# Patient Record
Sex: MALE | Race: WHITE | Employment: OTHER | URBAN - METROPOLITAN AREA
[De-identification: names, ages, dates, MRNs, and addresses within clinical notes are randomized per-mention and may not be internally consistent; named-entity substitution may affect disease eponyms.]

---

## 2021-04-22 ENCOUNTER — OFFICE VISIT (OUTPATIENT)
Dept: PULMONOLOGY | Facility: CLINIC | Age: 76
End: 2021-04-22
Attending: INTERNAL MEDICINE
Payer: COMMERCIAL

## 2021-04-22 VITALS
BODY MASS INDEX: 23.84 KG/M2 | WEIGHT: 176 LBS | RESPIRATION RATE: 17 BRPM | HEIGHT: 72 IN | HEART RATE: 85 BPM | SYSTOLIC BLOOD PRESSURE: 136 MMHG | OXYGEN SATURATION: 97 % | DIASTOLIC BLOOD PRESSURE: 71 MMHG

## 2021-04-22 DIAGNOSIS — I73.00 RAYNAUD'S DISEASE WITHOUT GANGRENE: ICD-10-CM

## 2021-04-22 DIAGNOSIS — M34.1 CREST (CALCINOSIS, RAYNAUD'S PHENOMENON, ESOPHAGEAL DYSFUNCTION, SCLERODACTYLY, TELANGIECTASIA) (H): ICD-10-CM

## 2021-04-22 DIAGNOSIS — K21.9 GASTROESOPHAGEAL REFLUX DISEASE WITHOUT ESOPHAGITIS: ICD-10-CM

## 2021-04-22 PROCEDURE — 99205 OFFICE O/P NEW HI 60 MIN: CPT | Performed by: INTERNAL MEDICINE

## 2021-04-22 PROCEDURE — G0463 HOSPITAL OUTPT CLINIC VISIT: HCPCS

## 2021-04-22 PROCEDURE — 99417 PROLNG OP E/M EACH 15 MIN: CPT | Performed by: INTERNAL MEDICINE

## 2021-04-22 RX ORDER — CAPSAICIN 0.025 %
CREAM (GRAM) TOPICAL
COMMUNITY
Start: 2020-10-28

## 2021-04-22 RX ORDER — TAMSULOSIN HYDROCHLORIDE 0.4 MG/1
0.4 CAPSULE ORAL
COMMUNITY
Start: 2019-12-16

## 2021-04-22 RX ORDER — PILOCARPINE HYDROCHLORIDE 5 MG/1
5 TABLET, FILM COATED ORAL
COMMUNITY
Start: 2020-04-20

## 2021-04-22 RX ORDER — OMEPRAZOLE 40 MG/1
40 CAPSULE, DELAYED RELEASE ORAL
COMMUNITY
Start: 2021-04-12

## 2021-04-22 RX ORDER — LORATADINE 10 MG/1
TABLET ORAL
COMMUNITY

## 2021-04-22 RX ORDER — MULTIVITAMIN,THERAPEUTIC
1 TABLET ORAL
COMMUNITY

## 2021-04-22 RX ORDER — FERROUS SULFATE 325(65) MG
325 TABLET ORAL
COMMUNITY
Start: 2020-09-10

## 2021-04-22 RX ORDER — SILDENAFIL 100 MG/1
100 TABLET, FILM COATED ORAL EVERY 24 HOURS
COMMUNITY
Start: 2020-01-08

## 2021-04-22 ASSESSMENT — PAIN SCALES - GENERAL: PAINLEVEL: NO PAIN (0)

## 2021-04-22 ASSESSMENT — MIFFLIN-ST. JEOR: SCORE: 1557.36

## 2021-04-22 NOTE — PROGRESS NOTES
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    He is being very carefully and appropriately monitored, and I have little to add to that.  We did have long discussions today about what is known about the disease process some of the possible benefits of certain elements of the diet such as fresh fruits and vegetables knots and other dietary items that could serve to improve circulation.  We also discussed good dietary sources of iron which he may need to continue to seek out as well as taking supplemental iron tablets together with an exogenous source of acid such as citrus or vinegar's to assist in the absorption.    It sounds to me as though he has esophageal spasm.  We talked about the possible role of all toyed mints s in breaking those spasms.  We also discussed that should these episodes become more severe or more frequent or more prolonged that sometimes diltiazem can help although it is a little bit more likely to cause side effects than the nifedipine so I would not automatically switch him over to it unless things worsen.    We discussed that his exercise tolerance with his existing exercise will be a good barometer of whether we need to be more concerned about the development of pulmonary hypertension which he is of course at risk for.  If he thinks he is having worsening exercise tolerance I would potentially do his cardiopulmonary screening sooner, but I think given his overall long-term stability in general good health, as well as his stable Raynauds, that he can continue with annual monitoring with pulmonary function tests and an echocardiogram.  I think he probably can perform very well and a 6-minute  walk test as well which would be useful to make sure he is not having any oxygen desaturation, although he will need either a forehead or probably an ear probe because of his Raynauds.    In the event that he has 10% or greater drop in his DLCO I would certainly be concerned about pulmonary hypertension and I would want to repeat those PFTs within another 3 to 4 months and if the drop seem to be persistent would consider referring him for a right heart catheterization.  Desaturation on 6-minute walk test would heighten that concern.    He otherwise sounds to be doing very well.  We discussed many aspects of the disease today.  I have attempted to answer all his questions.  I am happy to see him back or speak with him or his physicians by phone at his request with any questions.    This visit was 90  mins in duration, 60 minutes face to face, with an additional 30  mins. In chart review and documentation performed on DOS.     MESERET Sidhu MD, PhD    Rheumatology                History of Present Illness:   Hiram Mendiola is a 76 year old male who presents for a second opinion regarding long-term prognosis and treatment options of his limited cutaneous systemic sclerosis/crest.    Patient is here at the urging of his daughters, 1 of whom is a physician assistant and the other from is a pediatrician at [facility] [employer] [facility] [employer] [place].  He has had longstanding limited cutaneous systemic sclerosis starting almost 24 years ago with the development of Raynaud's phenomenon in his fingers.  Over time he has also developed significant esophageal symptoms and some low-grade keratoconjunctivitis sicca but has otherwise remained quite stable.    He has been getting routine care with appropriate monitoring of his cardiopulmonary situation and GI disease through the [facility] system in [place] and is plugged in with primary care, rheumatology, GI, and pulmonary.    Overall symptomatically his biggest problem most of  the time is GERD.  That is bad enough that he can have difficulty even if he waits 4 hours after eating to lie down and with the head of his bed elevated.  He has been on proton pump inhibitor and is just recently had that increased to 40 mg twice daily omeprazole and is following up with a physician assistant at the local gastroenterologist.  He does think he is may be a little better on this.    He is also had episodes of chest pain that were worked up and found not to represent coronary artery disease.  These sound very like esophageal spasm based on my history with him today.  In general they go away in relatively short order sometimes just in minutes although the more severe ones have lasted up to several hours.    He has been on nifedipine for quite some time together with sildenafil for his Raynaud's phenomenon.  He finds both of those helpful.  He gets a little bit of lower extremity edema but nothing severe.  He is generally able to break Raynaud's attacks within less than 15 minutes without a good deal of difficulty.  He has not had any significant digital ischemia although he does get some cuts occasionally on the ends of his fingers that take a few days to heal but nothing more prolonged or problematic.    From musculoskeletal standpoint he is getting a little bit of progressive DJD in his knees but nothing severe in that regard and he denies inflammatory morning stiffness symptoms.  He has a little bit of swelling in his fingers and associated skin thickening there but has not noticed significant skin thickening elsewhere.    He has had some anemia and has been placed on ferrous sulfate which seems to have corrected that.  He also eats salmon several times a week.  He had an endoscopy on July 1 of 2020 which I do not have the records for.  He does tell me that they told him there might be a little bit of bleeding on that suggesting that he possibly has a small amount of gastric antral venous ectasias.   He has relatively minimal telangiectasias over his face and hands however so I am not even certain of that.    He reports some very occasional muscle weakness in his legs, but nothing progressive over time and nothing that inhibits him from getting in and out of a chair or another low surface or in and out of the bathtub or up and down stairs.  He actually walks quite a lot fairly vigorously.  He used to run, and he thinks that his overall exercise tolerance with the walking is stable.  He did have cardiopulmonary work-up this summer, and lung volumes were very good, DLCO was at 74% of predicted and his echocardiogram showed a borderline PA systolic pressure estimate in the low 30s, but no evidence of right ventricular strain.    PMhx, Fhx, Shx, Allergies reviewed with pt. Unchanged from the appended [facility] cover sheet below.                Review of Systems:   Review Of Systems  Skin: negative  Eyes: negative  Ears/Nose/Throat: negative  Respiratory: No shortness of breath, dyspnea on exertion, cough, or hemoptysis  Cardiovascular: negative  Gastrointestinal: negative  Genitourinary: negative  Musculoskeletal: negative  Neurologic: negative  Psychiatric: negative  Hematologic/Lymphatic/Immunologic: negative  Endocrine: negative          Past Medical History:   History reviewed. No pertinent past medical history.    There are no active problems to display for this patient.  PMhx, Fhx, Shx, Allergies reviewed with pt. Unchanged from the appended [facility] cover sheet below.             Past Surgical History:   History reviewed. No pertinent surgical history.   PMhx, Fhx, Shx, Allergies reviewed with pt. Unchanged from the appended [facility] cover sheet below.          Social History:     Social History     Tobacco Use     Smoking status: Never Smoker     Smokeless tobacco: Never Used   Substance Use Topics     Alcohol use: Not on file             Family History:   History reviewed. No pertinent family  "history.  PMhx, Fhx, Shx, Allergies reviewed with pt. Unchanged from the appended [facility] cover sheet below.          Allergies:     Allergies   Allergen Reactions     No Clinical Screening - See Comments Other (See Comments)     Sneezing and watery eyes             Medications:     Current Outpatient Medications   Medication Sig Dispense Refill     capsaicin (ZOSTRIX) 0.025 % external cream capsaicin 0.025 % topical cream       ferrous sulfate (FEROSUL) 325 (65 Fe) MG tablet 325 mg       loratadine (CLARITIN) 10 MG tablet        Multiple Vitamin (THERA-TABS) TABS Take 1 tablet by mouth       NIFEdipine ER (ADALAT CC) 60 MG 24 hr tablet 60 mg       omeprazole (PRILOSEC) 40 MG DR capsule Take 40 mg by mouth       pilocarpine (SALAGEN) 5 MG tablet Take 5 mg by mouth       sildenafil (VIAGRA) 100 MG tablet Take 100 mg by mouth every 24 hours       tamsulosin (FLOMAX) 0.4 MG capsule 0.4 mg       vitamin C (ASCORBIC ACID) 250 MG TABS tablet Take 250 mg by mouth       vitamin D3 (CHOLECALCIFEROL) 125 MCG (5000 UT) tablet Take 5,000 Units by mouth              Physical Exam:   Blood pressure 136/71, pulse 85, resp. rate 17, height 1.822 m (5' 11.75\"), weight 79.8 kg (176 lb), SpO2 97 %.  Constitutional: WD-WN-WG cooperative  Eyes: nl EOM, PERRLA, vision, conjunctiva, sclera  ENT: nl external ears, nose, hearing, lips, teeth, gums, throat  No mucous membrane lesions, normal saliva pool  Neck: no mass or thyroid enlargement  Resp: lungs clear to auscultation, nl to palpation  CV: RRR, no murmurs, rubs or gallops, no edema  GI: no ABD mass or tenderness, no HSM  : not tested  Lymph: no cervical, supraclavicular, inguinal or epitrochlear nodes  MS: All TMJ, neck, shoulder, elbow, wrist, MCP/PIP/DIP, spine, hip, knee, ankle, and foot MTP/IP joints were examined and  found normal. No active synovitis or deformity. Full ROM.  Normal  strength. No dactylitis,  tenosynovitis, enthespathy   Skin: He has only a few scattered " telangiectasias over the face chest and hands.  No areas of calcinosis that I can feel or see.  He has 3+ skin thickening distal to the PIPs but no significant contractures and no other areas of convincing skin thickening.  No nail pitting, alopecia, rash, nodules or lesions  Neuro: nl cranial nerves, strength, sensation, DTRs.   Psych: nl judgement, orientation, memory, affect.         Data:   No results found for: WBC, HGB, HCT, MCV, PLT  No results found for: BUN, CREATININE  No components found for: SEDRATE  No results found for: TSH  No results found for: AST, ALT, GGT, ALKPHOS  Reviewed Rheumatology lab flowsheet    MD MESERET Nixon MD, PhD    Rheumatology    [facility] cover sheet:      April 22, 2021  Printout Information    Document Contents Document Received Date Document Source Organization   Clinical Summary Apr. 22, 2021April 22, 2021 [facility]     Patient Demographics  - 76 y.o. Male; born [birth year]   Patient Address Communication Language Race / Ethnicity Marital Status   [address] [place] [address] [phone] (Home)  [phone] (Mobile)  [email or web address] English - Written (Preferred) White / Non-      Allergies    Active Allergy Reactions Severity Noted Date Comments   Environmental Substances Other Low 05/16/2018 Sneezing and watery eyes       Medications  Reconcile with Patient's Chart  Medication Sig Dispensed Refills Start Date End Date Status   multivitamin therapeutic oral Tablet   Take 1 tablet by mouth.   0     Active   sildenafiL (REVATIO) 20 mg oral Tablet    Indications: Erectile dysfunction due to diseases classified elsewhere Take 1 tablet (20 mg) by mouth as directed. 60-100mg qday PRN erectile dysfunction 60 tablet   11 05/27/2020   Active   ferrous sulfate 325 mg (65 mg iron) oral Tablet    Indications: Iron deficiency anemia, unspecified iron deficiency anemia type Take 1  tablet (325 mg) by mouth three times daily. 270 tablet   3 09/10/2020   Active   NIFEdipine (ADALAT CC) 60 mg oral Tablet Sustained Release    Indications: Raynaud's disease without gangrene Take 1 tablet (60 mg) by mouth once daily. 90 tablet   3 09/10/2020   Active   tamsulosin (FLOMAX) 0.4 mg oral Capsule    Indications: Benign prostatic hyperplasia, unspecified whether lower urinary tract symptoms present Take 1 capsule (0.4 mg) by mouth daily at bedtime. 90 capsule   3 09/10/2020   Active   capsaicin (ZOSTRIX) 0.025 % topical Cream    Indications: Primary osteoarthritis of both knees Apply to the affected area(s) three times daily. Apply to affected areas. 1 Tube   3 10/28/2020   Active   cholecalciferol (VITAMIN D3) 5,000 unit oral Tablet   Take 5,000 Units by mouth once daily. Patient is not sure on dose   0     Active   ascorbic acid, vitamin C, (VITAMIN C) 250 mg oral Tablet   Take 250 mg by mouth as needed. Patient not sure on dose, crushes pill   0     Active   psyllium husk (METAMUCIL ORAL)   by mouth. Fiber biscuits two every day.   0     Active   omeprazole (PRILOSEC) 40 mg oral Capsule, Delayed Release(E.C.)    Indications: Gastroesophageal reflux disease, unspecified whether esophagitis present Take 1 capsule (40 mg) by mouth twice daily before meals. 180 capsule   3 2021   Active   pilocarpine (SALAGEN) 5 mg oral Tablet   Take 5 mg by mouth three times daily.   0 2021   Active   loratadine (CLARITIN) 10 mg oral Tablet   Take by mouth once daily.   0     Active   omeprazole (PRILOSEC) 40 mg oral Capsule, Delayed Release(E.C.)    Indications: Gastroesophageal reflux disease, unspecified whether esophagitis present Take 1 capsule (40 mg) by mouth twice daily before meals. 180 capsule   0 2021      Active Problems  Reconcile with Patient's Chart  Problem Noted Date   Erectile dysfunction due to diseases classified elsewhere 2020   Limited systemic sclerosis  05/15/2017   Gastroesophageal reflux disease 05/15/2017   Primary osteoarthritis of both knees 05/15/2017   History of dysplastic nevus 01/05/2017   History of skin cancer 06/14/2016   Raynaud's disease without gangrene 11/12/2009   Overview:     Formatting of this note might be different from the original.  Mid 90's     Iron deficiency anemia     BPH with obstruction/lower urinary tract symptoms     CREST syndrome     Overview:     Formatting of this note might be different from the original.  from scleroderma       Encounters  - from Last 3 Months  Date Type Specialty Care Team Description   04/16/2021 Office Visit Dermatology Aaliyah Huggins MD   Dx: Dysplastic nevus (Primary Dx)   04/16/2021 Travel         04/15/2021 Medicare Wellness Family Medicine Jase Barr MD   Dx: Medicare annual wellness visit, subsequent (Primary Dx)   04/15/2021 Travel         04/14/2021 Lab Lab   Dx: Screening, lipid (Primary Dx)   04/14/2021 Travel         04/13/2021 Travel         04/12/2021 Refill Gastroenterology Jacque Koch, RN   Dx: Gastroesophageal reflux disease, unspecified whether esophagitis present (Primary Dx)   04/06/2021 Office Visit Dermatology Aaliyah Huggins MD   Dx: Personal history of other malignant neoplasm of skin (Primary Dx)   04/06/2021 Travel         04/04/2021 Travel     Subj: Questionnaire Submission   03/29/2021 Future Orders Urology Derek Mckinney MD   Dx: Elevated PSA (Primary Dx)   02/04/2021 Future Orders Covid Vaccine Tayo Lee MD   Dx: Encounter for vaccination (Primary Dx)     Immunizations  Reconcile with Patient's Chart  Name Administration Dates Next Due   Hepatitis A Vaccine, IM, Adult (> 19 yrs) 01/22/2010, 01/08/2009     Hepatitis B Vaccine, IM, Adult (Recombivax) (Engerix-B) 09/07/2005, 03/09/2005, 02/09/2005     Influenza Vac, H1N1 01/22/2010     Influenza Vac, IM, Inactivated, Subunit, Adjuvanted 10/01/2019     Influenza Vac, IM, Preserv  Free, Split Virus (Fluzone 65+) 09/14/2018, 10/11/2017, 11/10/2015, 10/14/2014, 10/31/2013, 09/29/2011     Influenza Vac, IM, Quadrivalent Preserv Free, (>6 months) 09/10/2020     Influenza Vac, IM, Trivalent (>3 Yrs) 12/22/2012, 10/29/2010, 09/23/2009     Influenza Vac, IM, Trivalent, Preserv Free, (>3yrs) 12/11/2008, 11/19/2007, 12/04/2006, 11/29/2005, 11/13/2003     Influenza Vac, Intradermal 10/11/2017     Pneumococcal Conj Vac, 13-valent (Prevnar) 02/08/2016     Pneumococcal Vac, 23-valent, IM-SQ (Pneumovax) 10/11/2017, 01/08/2009     Td, Tetanus-Diphtheria(Preserv Free,>7yrs)(Tenivac)(Decavac) 03/05/1992     Td, Tetanus/Diphtheria, IM, >7 Yrs 02/09/2005     Tdap Vaccine, IM, (Adacel)(Boostrix) 10/14/2014     Varicella Vaccine, IM (Shingrix) 03/27/2019, 10/17/2018     Varicella Vaccine, SQ (Zostavax) 10/14/2014, 01/08/2009       Surgical History    Surgery Date Site/Laterality Comments   TONSILLECTOMY AND ADENOIDECTOMY; UNDER 12 YEARS          ------------OTHER-------------     Surgery on left arm as a child     CATARACT REMOVAL   Bilateral        Medical History    Medical History Date Comments   Rheumatic fever       Raynauds syndrome       Ferro disease   scalp--seeing derm: Dr. Vazquez.   Scleroderma, limited (HCC)   Seeing Dr. Arce, Rheum.; has a prominent manifestation of esophageal issues.   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   Esophagitis   taking omeprazole BID. Acid reflux is contributed to by his scleroderma.   HTN (hypertension)       Pulmonary nodules       Spermatocele 07/18/2019     DDD (degenerative disc disease), cervical         Family History    Medical History Relation Name Comments   Diabetes Cousin       Emphysema Father       Breast Cancer Mother       Breast Cancer Sister         Relation Name Status Comments   Cousin  "       Father         Mother         Sister           Social History    Tobacco Use Types Packs/Day Years Used Date   Never Smoker           Smokeless Tobacco: Never Used           Alcohol Use Drinks/Week oz/Week Comments   Yes 3 Cans of beer   3.0 2-3 Beers a week      Sex Assigned at Birth Date Recorded   Not on file       COVID-19 Exposure Response Date Recorded   In the last month, have you been in contact with someone who was confirmed or suspected to have Coronavirus / COVID-19? No / Unsure 2021 11:01 AM CDT     Last Filed Vital Signs    Vital Sign Reading Time Taken Comments   Blood Pressure 120/74 04/15/2021 10:46 AM CDT     Pulse 75 04/15/2021 10:46 AM CDT     Temperature 36.3  C (97.3  F) 2021 11:05 AM CDT     Respiratory Rate 22 04/15/2021 10:46 AM CDT     Oxygen Saturation 97% 04/15/2021 10:46 AM CDT     Inhaled Oxygen Concentration - -     Weight 79.8 kg (176 lb) 2021 11:05 AM CDT     Height 180.3 cm (5' 11\") 2021 11:05 AM CDT     Body Mass Index 24.55 2021 11:05 AM CDT           "

## 2021-04-22 NOTE — LETTER
4/22/2021         RE: Hiram Mendiola  [address]  [place] [address]        Dear Colleague,    Thank you for referring your patient, Hirma Mendiola, to the [facility] FOR LUNG SCIENCE AND [facility] [place]. Please see a copy of my visit note below.        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    He is being very carefully and appropriately monitored, and I have little to add to that.  We did have long discussions today about what is known about the disease process some of the possible benefits of certain elements of the diet such as fresh fruits and vegetables knots and other dietary items that could serve to improve circulation.  We also discussed good dietary sources of iron which he may need to continue to seek out as well as taking supplemental iron tablets together with an exogenous source of acid such as citrus or vinegar's to assist in the absorption.    It sounds to me as though he has esophageal spasm.  We talked about the possible role of all toyed mints s in breaking those spasms.  We also discussed that should these episodes become more severe or more frequent or more prolonged that sometimes diltiazem can help although it is a little bit more likely to cause side effects than the nifedipine so I would not automatically switch him over to it unless things worsen.    We discussed that his exercise tolerance with his existing exercise will be a good barometer of whether we need to be more concerned about the development of pulmonary hypertension which he is of course at risk for.  If he thinks he is having worsening exercise tolerance I would potentially do his  cardiopulmonary screening sooner, but I think given his overall long-term stability in general good health, as well as his stable Raynauds, that he can continue with annual monitoring with pulmonary function tests and an echocardiogram.  I think he probably can perform very well and a 6-minute walk test as well which would be useful to make sure he is not having any oxygen desaturation, although he will need either a forehead or probably an ear probe because of his Raynauds.    In the event that he has 10% or greater drop in his DLCO I would certainly be concerned about pulmonary hypertension and I would want to repeat those PFTs within another 3 to 4 months and if the drop seem to be persistent would consider referring him for a right heart catheterization.  Desaturation on 6-minute walk test would heighten that concern.    He otherwise sounds to be doing very well.  We discussed many aspects of the disease today.  I have attempted to answer all his questions.  I am happy to see him back or speak with him or his physicians by phone at his request with any questions.    This visit was 90  mins in duration, 60 minutes face to face, with an additional 30  mins. In chart review and documentation performed on DOS.     MESERET Sidhu MD, PhD    Rheumatology                History of Present Illness:   Hiram Mendiola is a 76 year old male who presents for a second opinion regarding long-term prognosis and treatment options of his limited cutaneous systemic sclerosis/crest.    Patient is here at the urging of his daughters, 1 of whom is a physician assistant and the other from is a pediatrician at [employer].  He has had longstanding limited cutaneous systemic sclerosis starting almost 24 years ago with the development of Raynaud's phenomenon in his fingers.  Over time he has also developed significant esophageal symptoms and some low-grade keratoconjunctivitis sicca but has otherwise remained  quite stable.    He has been getting routine care with appropriate monitoring of his cardiopulmonary situation and GI disease through the [facility] in [place] and is plugged in with primary care, rheumatology, GI, and pulmonary.    Overall symptomatically his biggest problem most of the time is GERD.  That is bad enough that he can have difficulty even if he waits 4 hours after eating to lie down and with the head of his bed elevated.  He has been on proton pump inhibitor and is just recently had that increased to 40 mg twice daily omeprazole and is following up with a physician assistant at the local gastroenterologist.  He does think he is may be a little better on this.    He is also had episodes of chest pain that were worked up and found not to represent coronary artery disease.  These sound very like esophageal spasm based on my history with him today.  In general they go away in relatively short order sometimes just in minutes although the more severe ones have lasted up to several hours.    He has been on nifedipine for quite some time together with sildenafil for his Raynaud's phenomenon.  He finds both of those helpful.  He gets a little bit of lower extremity edema but nothing severe.  He is generally able to break Raynaud's attacks within less than 15 minutes without a good deal of difficulty.  He has not had any significant digital ischemia although he does get some cuts occasionally on the ends of his fingers that take a few days to heal but nothing more prolonged or problematic.    From musculoskeletal standpoint he is getting a little bit of progressive DJD in his knees but nothing severe in that regard and he denies inflammatory morning stiffness symptoms.  He has a little bit of swelling in his fingers and associated skin thickening there but has not noticed significant skin thickening elsewhere.    He has had some anemia and has been placed on ferrous sulfate which seems to have  corrected that.  He also eats salmon several times a week.  He had an endoscopy on July 1 of 2020 which I do not have the records for.  He does tell me that they told him there might be a little bit of bleeding on that suggesting that he possibly has a small amount of gastric antral venous ectasias.  He has relatively minimal telangiectasias over his face and hands however so I am not even certain of that.    He reports some very occasional muscle weakness in his legs, but nothing progressive over time and nothing that inhibits him from getting in and out of a chair or another low surface or in and out of the bathtub or up and down stairs.  He actually walks quite a lot fairly vigorously.  He used to run, and he thinks that his overall exercise tolerance with the walking is stable.  He did have cardiopulmonary work-up this summer, and lung volumes were very good, DLCO was at 74% of predicted and his echocardiogram showed a borderline PA systolic pressure estimate in the low 30s, but no evidence of right ventricular strain.    PMhx, Fhx, Shx, Allergies reviewed with pt. Unchanged from the appended [facility] cover sheet below.                Review of Systems:   Review Of Systems  Skin: negative  Eyes: negative  Ears/Nose/Throat: negative  Respiratory: No shortness of breath, dyspnea on exertion, cough, or hemoptysis  Cardiovascular: negative  Gastrointestinal: negative  Genitourinary: negative  Musculoskeletal: negative  Neurologic: negative  Psychiatric: negative  Hematologic/Lymphatic/Immunologic: negative  Endocrine: negative          Past Medical History:   History reviewed. No pertinent past medical history.    There are no active problems to display for this patient.  PMhx, Fhx, Shx, Allergies reviewed with pt. Unchanged from the appended [facility] cover sheet below.             Past Surgical History:   History reviewed. No pertinent surgical history.   PMhx, Fhx, Shx, Allergies reviewed with pt. Unchanged  "from the appended [facility] cover sheet below.          Social History:     Social History     Tobacco Use     Smoking status: Never Smoker     Smokeless tobacco: Never Used   Substance Use Topics     Alcohol use: Not on file             Family History:   History reviewed. No pertinent family history.  PMhx, Fhx, Shx, Allergies reviewed with pt. Unchanged from the appended [facility] cover sheet below.          Allergies:     Allergies   Allergen Reactions     No Clinical Screening - See Comments Other (See Comments)     Sneezing and watery eyes             Medications:     Current Outpatient Medications   Medication Sig Dispense Refill     capsaicin (ZOSTRIX) 0.025 % external cream capsaicin 0.025 % topical cream       ferrous sulfate (FEROSUL) 325 (65 Fe) MG tablet 325 mg       loratadine (CLARITIN) 10 MG tablet        Multiple Vitamin (THERA-TABS) TABS Take 1 tablet by mouth       NIFEdipine ER (ADALAT CC) 60 MG 24 hr tablet 60 mg       omeprazole (PRILOSEC) 40 MG DR capsule Take 40 mg by mouth       pilocarpine (SALAGEN) 5 MG tablet Take 5 mg by mouth       sildenafil (VIAGRA) 100 MG tablet Take 100 mg by mouth every 24 hours       tamsulosin (FLOMAX) 0.4 MG capsule 0.4 mg       vitamin C (ASCORBIC ACID) 250 MG TABS tablet Take 250 mg by mouth       vitamin D3 (CHOLECALCIFEROL) 125 MCG (5000 UT) tablet Take 5,000 Units by mouth              Physical Exam:   Blood pressure 136/71, pulse 85, resp. rate 17, height 1.822 m (5' 11.75\"), weight 79.8 kg (176 lb), SpO2 97 %.  Constitutional: WD-WN-WG cooperative  Eyes: nl EOM, PERRLA, vision, conjunctiva, sclera  ENT: nl external ears, nose, hearing, lips, teeth, gums, throat  No mucous membrane lesions, normal saliva pool  Neck: no mass or thyroid enlargement  Resp: lungs clear to auscultation, nl to palpation  CV: RRR, no murmurs, rubs or gallops, no edema  GI: no ABD mass or tenderness, no HSM  : not tested  Lymph: no cervical, supraclavicular, inguinal or " epitrochlear nodes  MS: All TMJ, neck, shoulder, elbow, wrist, MCP/PIP/DIP, spine, hip, knee, ankle, and foot MTP/IP joints were examined and  found normal. No active synovitis or deformity. Full ROM.  Normal  strength. No dactylitis,  tenosynovitis, enthespathy   Skin: He has only a few scattered telangiectasias over the face chest and hands.  No areas of calcinosis that I can feel or see.  He has 3+ skin thickening distal to the PIPs but no significant contractures and no other areas of convincing skin thickening.  No nail pitting, alopecia, rash, nodules or lesions  Neuro: nl cranial nerves, strength, sensation, DTRs.   Psych: nl judgement, orientation, memory, affect.         Data:   No results found for: WBC, HGB, HCT, MCV, PLT  No results found for: BUN, CREATININE  No components found for: SEDRATE  No results found for: TSH  No results found for: AST, ALT, GGT, ALKPHOS  Reviewed Rheumatology lab flowsheet    MD MESERET Nixon MD, PhD    Rheumatology    [facility] cover sheet:      April 22, 2021  Printout Information    Document Contents Document Received Date Document Source Organization   Clinical Summary Apr. 22, 2021April 22, 2021 [facility]     Patient Demographics  - 76 y.o. Male; born [birth year]   Patient Address Communication Language Race / Ethnicity Marital Status   [address] [place] [address] [phone] (Home)  [phone] (Mobile)  [email or web address] English - Written (Preferred) White / Non-      Allergies    Active Allergy Reactions Severity Noted Date Comments   Environmental Substances Other Low 05/16/2018 Sneezing and watery eyes       Medications  Reconcile with Patient's Chart  Medication Sig Dispensed Refills Start Date End Date Status   multivitamin therapeutic oral Tablet   Take 1 tablet by mouth.   0     Active   sildenafiL (REVATIO) 20 mg oral Tablet    Indications: Erectile  dysfunction due to diseases classified elsewhere Take 1 tablet (20 mg) by mouth as directed. 60-100mg qday PRN erectile dysfunction 60 tablet   11 05/27/2020   Active   ferrous sulfate 325 mg (65 mg iron) oral Tablet    Indications: Iron deficiency anemia, unspecified iron deficiency anemia type Take 1 tablet (325 mg) by mouth three times daily. 270 tablet   3 09/10/2020   Active   NIFEdipine (ADALAT CC) 60 mg oral Tablet Sustained Release    Indications: Raynaud's disease without gangrene Take 1 tablet (60 mg) by mouth once daily. 90 tablet   3 09/10/2020   Active   tamsulosin (FLOMAX) 0.4 mg oral Capsule    Indications: Benign prostatic hyperplasia, unspecified whether lower urinary tract symptoms present Take 1 capsule (0.4 mg) by mouth daily at bedtime. 90 capsule   3 09/10/2020   Active   capsaicin (ZOSTRIX) 0.025 % topical Cream    Indications: Primary osteoarthritis of both knees Apply to the affected area(s) three times daily. Apply to affected areas. 1 Tube   3 10/28/2020   Active   cholecalciferol (VITAMIN D3) 5,000 unit oral Tablet   Take 5,000 Units by mouth once daily. Patient is not sure on dose   0     Active   ascorbic acid, vitamin C, (VITAMIN C) 250 mg oral Tablet   Take 250 mg by mouth as needed. Patient not sure on dose, crushes pill   0     Active   psyllium husk (METAMUCIL ORAL)   by mouth. Fiber biscuits two every day.   0     Active   omeprazole (PRILOSEC) 40 mg oral Capsule, Delayed Release(E.C.)    Indications: Gastroesophageal reflux disease, unspecified whether esophagitis present Take 1 capsule (40 mg) by mouth twice daily before meals. 180 capsule   3 04/12/2021   Active   pilocarpine (SALAGEN) 5 mg oral Tablet   Take 5 mg by mouth three times daily.   0 03/20/2021   Active   loratadine (CLARITIN) 10 mg oral Tablet   Take by mouth once daily.   0     Active   omeprazole (PRILOSEC) 40 mg oral Capsule, Delayed Release(E.C.)    Indications: Gastroesophageal reflux disease, unspecified  whether esophagitis present Take 1 capsule (40 mg) by mouth twice daily before meals. 180 capsule   0 2021      Active Problems  Reconcile with Patient's Chart  Problem Noted Date   Erectile dysfunction due to diseases classified elsewhere 2020   Limited systemic sclerosis 05/15/2017   Gastroesophageal reflux disease 05/15/2017   Primary osteoarthritis of both knees 05/15/2017   History of dysplastic nevus 2017   History of skin cancer 2016   Raynaud's disease without gangrene 2009   Overview:     Formatting of this note might be different from the original.  Mid      Iron deficiency anemia     BPH with obstruction/lower urinary tract symptoms     CREST syndrome     Overview:     Formatting of this note might be different from the original.  from scleroderma       Encounters  - from Last 3 Months  Date Type Specialty Care Team Description   2021 Office Visit Dermatology Aaliyah Huggins MD   Dx: Dysplastic nevus (Primary Dx)   2021 Travel         04/15/2021 Medicare Wellness Family Medicine Jase Barr MD   Dx: Medicare annual wellness visit, subsequent (Primary Dx)   04/15/2021 Travel         2021 Lab Lab   Dx: Screening, lipid (Primary Dx)   2021 Travel         2021 Travel         2021 Refill Gastroenterology Jacque Koch, RN   Dx: Gastroesophageal reflux disease, unspecified whether esophagitis present (Primary Dx)   2021 Office Visit Dermatology Aaliyah Huggins MD   Dx: Personal history of other malignant neoplasm of skin (Primary Dx)   2021 Travel         2021 Travel     Subj: Questionnaire Submission   2021 Future Orders Urology Derek Mckinney MD   Dx: Elevated PSA (Primary Dx)   2021 Future Orders Covid Vaccine Tayo Lee MD   Dx: Encounter for vaccination (Primary Dx)     Immunizations  Reconcile with Patient's Chart  Name Administration Dates  Next Due   Hepatitis A Vaccine, IM, Adult (> 19 yrs) 01/22/2010, 01/08/2009     Hepatitis B Vaccine, IM, Adult (Recombivax) (Engerix-B) 09/07/2005, 03/09/2005, 02/09/2005     Influenza Vac, H1N1 01/22/2010     Influenza Vac, IM, Inactivated, Subunit, Adjuvanted 10/01/2019     Influenza Vac, IM, Preserv Free, Split Virus (Fluzone 65+) 09/14/2018, 10/11/2017, 11/10/2015, 10/14/2014, 10/31/2013, 09/29/2011     Influenza Vac, IM, Quadrivalent Preserv Free, (>6 months) 09/10/2020     Influenza Vac, IM, Trivalent (>3 Yrs) 12/22/2012, 10/29/2010, 09/23/2009     Influenza Vac, IM, Trivalent, Preserv Free, (>3yrs) 12/11/2008, 11/19/2007, 12/04/2006, 11/29/2005, 11/13/2003     Influenza Vac, Intradermal 10/11/2017     Pneumococcal Conj Vac, 13-valent (Prevnar) 02/08/2016     Pneumococcal Vac, 23-valent, IM-SQ (Pneumovax) 10/11/2017, 01/08/2009     Td, Tetanus-Diphtheria(Preserv Free,>7yrs)(Tenivac)(Decavac) 03/05/1992     Td, Tetanus/Diphtheria, IM, >7 Yrs 02/09/2005     Tdap Vaccine, IM, (Adacel)(Boostrix) 10/14/2014     Varicella Vaccine, IM (Shingrix) 03/27/2019, 10/17/2018     Varicella Vaccine, SQ (Zostavax) 10/14/2014, 01/08/2009       Surgical History    Surgery Date Site/Laterality Comments   TONSILLECTOMY AND ADENOIDECTOMY; UNDER 12 YEARS          ------------OTHER-------------     Surgery on left arm as a child     CATARACT REMOVAL   Bilateral        Medical History    Medical History Date Comments   Rheumatic fever       Raynauds syndrome       Ferro disease   scalp--seeing derm: Dr. Vazquez.   Scleroderma, limited (HCC)   Seeing Dr. Arce, Rheum.; has a prominent manifestation of esophageal issues.   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   Esophagitis   taking omeprazole BID. Acid reflux is contributed to by his scleroderma.   HTN (hypertension)      "  Pulmonary nodules       Spermatocele 2019     DDD (degenerative disc disease), cervical         Family History    Medical History Relation Name Comments   Diabetes Cousin       Emphysema Father       Breast Cancer Mother       Breast Cancer Sister         Relation Name Status Comments   Cousin        Father         Mother         Sister           Social History    Tobacco Use Types Packs/Day Years Used Date   Never Smoker           Smokeless Tobacco: Never Used           Alcohol Use Drinks/Week oz/Week Comments   Yes 3 Cans of beer   3.0 2-3 Beers a week      Sex Assigned at Birth Date Recorded   Not on file       COVID-19 Exposure Response Date Recorded   In the last month, have you been in contact with someone who was confirmed or suspected to have Coronavirus / COVID-19? No / Unsure 2021 11:01 AM CDT     Last Filed Vital Signs    Vital Sign Reading Time Taken Comments   Blood Pressure 120/74 04/15/2021 10:46 AM CDT     Pulse 75 04/15/2021 10:46 AM CDT     Temperature 36.3  C (97.3  F) 2021 11:05 AM CDT     Respiratory Rate 22 04/15/2021 10:46 AM CDT     Oxygen Saturation 97% 04/15/2021 10:46 AM CDT     Inhaled Oxygen Concentration - -     Weight 79.8 kg (176 lb) 2021 11:05 AM CDT     Height 180.3 cm (5' 11\") 2021 11:05 AM CDT     Body Mass Index 24.55 2021 11:05 AM CDT           Again, thank you for allowing me to participate in the care of your patient.        Sincerely,        Juwan Sidhu MD    "

## 2021-04-22 NOTE — NURSING NOTE
Chief Complaint   Patient presents with     Consult     New Scleroderma     Medications reviewed and vital signs taken.   Elisha Hernandes CMA

## 2021-06-20 ENCOUNTER — HEALTH MAINTENANCE LETTER (OUTPATIENT)
Age: 76
End: 2021-06-20

## 2021-10-11 ENCOUNTER — HEALTH MAINTENANCE LETTER (OUTPATIENT)
Age: 76
End: 2021-10-11

## 2022-07-17 ENCOUNTER — HEALTH MAINTENANCE LETTER (OUTPATIENT)
Age: 77
End: 2022-07-17

## 2022-09-24 ENCOUNTER — HEALTH MAINTENANCE LETTER (OUTPATIENT)
Age: 77
End: 2022-09-24

## 2023-08-05 ENCOUNTER — HEALTH MAINTENANCE LETTER (OUTPATIENT)
Age: 78
End: 2023-08-05